# Patient Record
(demographics unavailable — no encounter records)

---

## 2025-04-01 NOTE — HISTORY OF PRESENT ILLNESS
[FreeTextEntry1] : 04/01/25 Reason for visit Initial evaluation 52 year old female with PMHx of DM2, HTN, proteinuria, CKD 2 She was referred for recurrent renal cyst. Previously had 3 cm lobulated renal cyst with thin septations shown in 6/2019 when she returned after US in 12/2019, there were no renal cyst reported. recent US shows Mid right anterior kidney 3.2 cm cystic structure w/ greater than four internal septations several of which demonstrate nodular thickening Bosniak 3/4 She has frequent/uncontrolled urination Denies peripheral edema/joint pain   FH: Renal cell cancer

## 2025-04-01 NOTE — PLAN
[TextEntry] : Plan Follow up with urologist, seen by Dr. Flores and will contact him again.  Repeat BMP Check UA,UPCR Continue current medications Jardiance 10 mg daily Follow up with recommended workups in 1 month

## 2025-04-01 NOTE — REVIEW OF SYSTEMS
[TextEntry] : Review of Systems: General: No weight loss, No recent fever, chills HEENT: no headache, no change in vision or hearing Pulmonary: No SOB, or cough  CVS: No chest pain, BOWEN, or change in exercise tolerance  Gastrointestinal: No Nausea/vomiting/constipation/diarrhea : No complaint of dysuria or urinary frequency, no excess foaming  Skin: Denies no new rash, lesions, ulcer  Musco skeletal: No edema  Neurological: No headache, dizziness, vertigo

## 2025-04-01 NOTE — RESULTS/DATA
[TextEntry] : Labs: 3/13/25 Mid right anterior kidney 3.2 cm cystic structure w/ greater than four internal septations several of which demonstrate nodular thickening Bosniak 3/4 Adrenal gland lesion-benign   3/6/25 139/4/100/26/8.4/0.87/gfr68/ca9.7

## 2025-04-01 NOTE — ASSESSMENT
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